# Patient Record
Sex: MALE | Race: WHITE | Employment: UNEMPLOYED | URBAN - NONMETROPOLITAN AREA
[De-identification: names, ages, dates, MRNs, and addresses within clinical notes are randomized per-mention and may not be internally consistent; named-entity substitution may affect disease eponyms.]

---

## 2018-10-02 ENCOUNTER — HOSPITAL ENCOUNTER (OUTPATIENT)
Age: 33
Discharge: HOME | End: 2018-10-02
Payer: COMMERCIAL

## 2018-10-02 LAB
ABSOLUTE EOS #: 0.2 K/UL (ref 0–0.4)
ABSOLUTE IMMATURE GRANULOCYTE: ABNORMAL K/UL (ref 0–0.3)
ABSOLUTE LYMPH #: 3.4 K/UL (ref 1–4.8)
ABSOLUTE MONO #: 0.5 K/UL (ref 0–1)
ALBUMIN SERPL-MCNC: 4.4 G/DL (ref 3.5–5.2)
ALBUMIN/GLOBULIN RATIO: ABNORMAL (ref 1–2.5)
ALP BLD-CCNC: 67 U/L (ref 40–129)
ALT SERPL-CCNC: 466 U/L (ref 5–41)
AST SERPL-CCNC: 163 U/L
BASOPHILS # BLD: 1 % (ref 0–2)
BASOPHILS ABSOLUTE: 0 K/UL (ref 0–0.2)
BILIRUB SERPL-MCNC: 0.51 MG/DL (ref 0.3–1.2)
BILIRUBIN DIRECT: 0.25 MG/DL
BILIRUBIN, INDIRECT: 0.26 MG/DL (ref 0–1)
DIFFERENTIAL TYPE: YES
EOSINOPHILS RELATIVE PERCENT: 4 % (ref 0–5)
GLOBULIN: ABNORMAL G/DL (ref 1.5–3.8)
HAV IGM SER IA-ACNC: ABNORMAL
HCT VFR BLD CALC: 43.5 % (ref 41–53)
HEMOGLOBIN: 14.9 G/DL (ref 13.5–17.5)
HEPATITIS B CORE IGM ANTIBODY: NONREACTIVE
HEPATITIS B SURFACE ANTIGEN: NONREACTIVE
HEPATITIS C ANTIBODY: REACTIVE
HIV AG/AB: NONREACTIVE
IMMATURE GRANULOCYTES: ABNORMAL %
LYMPHOCYTES # BLD: 59 % (ref 13–44)
MCH RBC QN AUTO: 29.7 PG (ref 26–34)
MCHC RBC AUTO-ENTMCNC: 34.4 G/DL (ref 31–37)
MCV RBC AUTO: 86.4 FL (ref 80–100)
MONOCYTES # BLD: 8 % (ref 5–9)
NRBC AUTOMATED: ABNORMAL PER 100 WBC
PDW BLD-RTO: 14 % (ref 12.1–15.2)
PLATELET # BLD: 234 K/UL (ref 140–450)
PLATELET ESTIMATE: ABNORMAL
PMV BLD AUTO: ABNORMAL FL (ref 6–12)
RBC # BLD: 5.03 M/UL (ref 4.5–5.9)
RBC # BLD: ABNORMAL 10*6/UL
SEG NEUTROPHILS: 28 % (ref 39–75)
SEGMENTED NEUTROPHILS ABSOLUTE COUNT: 1.6 K/UL (ref 2.1–6.5)
TOTAL PROTEIN: 7.5 G/DL (ref 6.4–8.3)
WBC # BLD: 5.7 K/UL (ref 3.5–11)
WBC # BLD: ABNORMAL 10*3/UL

## 2018-10-02 PROCEDURE — 87522 HEPATITIS C REVRS TRNSCRPJ: CPT

## 2018-10-02 PROCEDURE — 82310 ASSAY OF CALCIUM: CPT

## 2018-10-02 PROCEDURE — 86480 TB TEST CELL IMMUN MEASURE: CPT

## 2018-10-02 PROCEDURE — 87389 HIV-1 AG W/HIV-1&-2 AB AG IA: CPT

## 2018-10-02 PROCEDURE — 36415 COLL VENOUS BLD VENIPUNCTURE: CPT

## 2018-10-02 PROCEDURE — 82565 ASSAY OF CREATININE: CPT

## 2018-10-02 PROCEDURE — 80076 HEPATIC FUNCTION PANEL: CPT

## 2018-10-02 PROCEDURE — 80074 ACUTE HEPATITIS PANEL: CPT

## 2018-10-02 PROCEDURE — 82947 ASSAY GLUCOSE BLOOD QUANT: CPT

## 2018-10-02 PROCEDURE — 85025 COMPLETE CBC W/AUTO DIFF WBC: CPT

## 2018-10-02 PROCEDURE — 80051 ELECTROLYTE PANEL: CPT

## 2018-10-02 PROCEDURE — 84520 ASSAY OF UREA NITROGEN: CPT

## 2018-10-03 LAB
ANION GAP SERPL CALCULATED.3IONS-SCNC: 13 MMOL/L (ref 9–17)
BUN BLDV-MCNC: 22 MG/DL (ref 6–20)
CALCIUM SERPL-MCNC: 9.5 MG/DL (ref 8.6–10.4)
CHLORIDE BLD-SCNC: 104 MMOL/L (ref 98–107)
CO2: 23 MMOL/L (ref 20–31)
CREAT SERPL-MCNC: 1.06 MG/DL (ref 0.7–1.2)
GFR AFRICAN AMERICAN: >60 ML/MIN
GFR NON-AFRICAN AMERICAN: >60 ML/MIN
GFR SERPL CREATININE-BSD FRML MDRD: ABNORMAL ML/MIN/{1.73_M2}
GFR SERPL CREATININE-BSD FRML MDRD: ABNORMAL ML/MIN/{1.73_M2}
GLUCOSE BLD-MCNC: 86 MG/DL (ref 70–99)
POTASSIUM SERPL-SCNC: 4.4 MMOL/L (ref 3.7–5.3)
SODIUM BLD-SCNC: 140 MMOL/L (ref 135–144)

## 2018-10-04 LAB
DIRECT EXAM: ABNORMAL
Lab: ABNORMAL
SPECIMEN DESCRIPTION: ABNORMAL
STATUS: ABNORMAL

## 2018-10-05 LAB
SEND OUT REPORT: NORMAL
TEST NAME: NORMAL

## 2018-10-07 LAB
QUANTI TB GOLD PLUS: NEGATIVE
QUANTI TB1 MINUS NIL: 0.03 IU/ML (ref 0–0.34)
QUANTI TB2 MINUS NIL: 0.09 IU/ML (ref 0–0.34)
QUANTIFERON MITOGEN: >10 IU/ML
QUANTIFERON NIL: 0.09 IU/ML

## 2019-04-12 ENCOUNTER — HOSPITAL ENCOUNTER (OUTPATIENT)
Age: 34
Discharge: HOME | End: 2019-04-12
Payer: COMMERCIAL

## 2019-04-12 LAB
ABSOLUTE EOS #: 0.2 K/UL (ref 0–0.4)
ABSOLUTE IMMATURE GRANULOCYTE: ABNORMAL K/UL (ref 0–0.3)
ABSOLUTE LYMPH #: 3.7 K/UL (ref 1–4.8)
ABSOLUTE MONO #: 0.6 K/UL (ref 0–1)
ALBUMIN SERPL-MCNC: 4.3 G/DL (ref 3.5–5.2)
ALBUMIN/GLOBULIN RATIO: ABNORMAL (ref 1–2.5)
ALP BLD-CCNC: 61 U/L (ref 40–129)
ALT SERPL-CCNC: 137 U/L (ref 5–41)
ANION GAP SERPL CALCULATED.3IONS-SCNC: 11 MMOL/L (ref 9–17)
AST SERPL-CCNC: 76 U/L
BASOPHILS # BLD: 0 % (ref 0–2)
BASOPHILS ABSOLUTE: 0 K/UL (ref 0–0.2)
BILIRUB SERPL-MCNC: 0.47 MG/DL (ref 0.3–1.2)
BILIRUBIN DIRECT: <0.08 MG/DL
BILIRUBIN, INDIRECT: ABNORMAL MG/DL (ref 0–1)
BUN BLDV-MCNC: 16 MG/DL (ref 6–20)
CALCIUM SERPL-MCNC: 9.1 MG/DL (ref 8.6–10.4)
CHLORIDE BLD-SCNC: 102 MMOL/L (ref 98–107)
CO2: 25 MMOL/L (ref 20–31)
CREAT SERPL-MCNC: 0.89 MG/DL (ref 0.7–1.2)
DIFFERENTIAL TYPE: YES
EOSINOPHILS RELATIVE PERCENT: 4 % (ref 0–5)
GFR AFRICAN AMERICAN: >60 ML/MIN
GFR NON-AFRICAN AMERICAN: >60 ML/MIN
GFR SERPL CREATININE-BSD FRML MDRD: ABNORMAL ML/MIN/{1.73_M2}
GFR SERPL CREATININE-BSD FRML MDRD: ABNORMAL ML/MIN/{1.73_M2}
GLUCOSE BLD-MCNC: 107 MG/DL (ref 70–99)
HCT VFR BLD CALC: 40.9 % (ref 41–53)
HEMOGLOBIN: 13.7 G/DL (ref 13.5–17.5)
IMMATURE GRANULOCYTES: ABNORMAL %
LYMPHOCYTES # BLD: 60 % (ref 13–44)
MCH RBC QN AUTO: 28.8 PG (ref 26–34)
MCHC RBC AUTO-ENTMCNC: 33.4 G/DL (ref 31–37)
MCV RBC AUTO: 86.2 FL (ref 80–100)
MONOCYTES # BLD: 9 % (ref 5–9)
NRBC AUTOMATED: ABNORMAL PER 100 WBC
PDW BLD-RTO: 14.1 % (ref 12.1–15.2)
PLATELET # BLD: 263 K/UL (ref 140–450)
PLATELET ESTIMATE: ABNORMAL
PMV BLD AUTO: ABNORMAL FL (ref 6–12)
POTASSIUM SERPL-SCNC: 3.8 MMOL/L (ref 3.7–5.3)
RBC # BLD: 4.74 M/UL (ref 4.5–5.9)
RBC # BLD: ABNORMAL 10*6/UL
SEG NEUTROPHILS: 27 % (ref 39–75)
SEGMENTED NEUTROPHILS ABSOLUTE COUNT: 1.7 K/UL (ref 2.1–6.5)
SODIUM BLD-SCNC: 138 MMOL/L (ref 135–144)
TOTAL PROTEIN: 7.8 G/DL (ref 6.4–8.3)
WBC # BLD: 6.2 K/UL (ref 3.5–11)
WBC # BLD: ABNORMAL 10*3/UL

## 2019-04-12 PROCEDURE — 87522 HEPATITIS C REVRS TRNSCRPJ: CPT

## 2019-04-12 PROCEDURE — 87389 HIV-1 AG W/HIV-1&-2 AB AG IA: CPT

## 2019-04-12 PROCEDURE — 36415 COLL VENOUS BLD VENIPUNCTURE: CPT

## 2019-04-12 PROCEDURE — 85025 COMPLETE CBC W/AUTO DIFF WBC: CPT

## 2019-04-12 PROCEDURE — 80053 COMPREHEN METABOLIC PANEL: CPT

## 2019-04-12 PROCEDURE — 82248 BILIRUBIN DIRECT: CPT

## 2019-04-12 PROCEDURE — 86480 TB TEST CELL IMMUN MEASURE: CPT

## 2019-04-12 PROCEDURE — 80074 ACUTE HEPATITIS PANEL: CPT

## 2019-04-13 LAB
HAV IGM SER IA-ACNC: NONREACTIVE
HEPATITIS B CORE IGM ANTIBODY: NONREACTIVE
HEPATITIS B SURFACE ANTIGEN: NONREACTIVE
HEPATITIS C ANTIBODY: REACTIVE

## 2019-04-14 LAB
QUANTI TB GOLD PLUS: NEGATIVE
QUANTI TB1 MINUS NIL: 0.06 IU/ML (ref 0–0.34)
QUANTI TB2 MINUS NIL: 0.04 IU/ML (ref 0–0.34)
QUANTIFERON MITOGEN: 8.2 IU/ML
QUANTIFERON NIL: 0.02 IU/ML

## 2019-04-15 LAB
DIRECT EXAM: ABNORMAL
DIRECT EXAM: ABNORMAL
HIV AG/AB: NONREACTIVE
Lab: ABNORMAL
SPECIMEN DESCRIPTION: ABNORMAL

## 2019-06-09 ENCOUNTER — HOSPITAL ENCOUNTER (INPATIENT)
Age: 34
LOS: 2 days | Discharge: HOME | DRG: 383 | End: 2019-06-11
Attending: INTERNAL MEDICINE | Admitting: INTERNAL MEDICINE
Payer: COMMERCIAL

## 2019-06-09 ENCOUNTER — APPOINTMENT (OUTPATIENT)
Dept: CT IMAGING | Age: 34
DRG: 383 | End: 2019-06-09
Payer: COMMERCIAL

## 2019-06-09 DIAGNOSIS — L03.113 CELLULITIS OF RIGHT FOREARM: Primary | ICD-10-CM

## 2019-06-09 DIAGNOSIS — Z72.0 TOBACCO ABUSE: ICD-10-CM

## 2019-06-09 PROBLEM — L02.413 ABSCESS OF RIGHT FOREARM: Status: ACTIVE | Noted: 2019-06-09

## 2019-06-09 LAB
ABSOLUTE EOS #: 0.2 K/UL (ref 0–0.4)
ABSOLUTE IMMATURE GRANULOCYTE: ABNORMAL K/UL (ref 0–0.3)
ABSOLUTE LYMPH #: 2.5 K/UL (ref 1–4.8)
ABSOLUTE MONO #: 0.9 K/UL (ref 0–1)
ANION GAP SERPL CALCULATED.3IONS-SCNC: 13 MMOL/L (ref 9–17)
BASOPHILS # BLD: 0 % (ref 0–2)
BASOPHILS ABSOLUTE: 0 K/UL (ref 0–0.2)
BUN BLDV-MCNC: 16 MG/DL (ref 6–20)
BUN/CREAT BLD: 14 (ref 9–20)
CALCIUM SERPL-MCNC: 9.5 MG/DL (ref 8.6–10.4)
CHLORIDE BLD-SCNC: 99 MMOL/L (ref 98–107)
CO2: 25 MMOL/L (ref 20–31)
CREAT SERPL-MCNC: 1.11 MG/DL (ref 0.7–1.2)
DIFFERENTIAL TYPE: YES
EOSINOPHILS RELATIVE PERCENT: 2 % (ref 0–5)
GFR AFRICAN AMERICAN: >60 ML/MIN
GFR NON-AFRICAN AMERICAN: >60 ML/MIN
GFR SERPL CREATININE-BSD FRML MDRD: ABNORMAL ML/MIN/{1.73_M2}
GFR SERPL CREATININE-BSD FRML MDRD: ABNORMAL ML/MIN/{1.73_M2}
GLUCOSE BLD-MCNC: 109 MG/DL (ref 70–99)
HCT VFR BLD CALC: 41.1 % (ref 41–53)
HEMOGLOBIN: 14.3 G/DL (ref 13.5–17.5)
HIGH SENSITIVE C-REACTIVE PROTEIN: 118.7 MG/L
IMMATURE GRANULOCYTES: ABNORMAL %
LYMPHOCYTES # BLD: 23 % (ref 13–44)
MCH RBC QN AUTO: 29.6 PG (ref 26–34)
MCHC RBC AUTO-ENTMCNC: 34.9 G/DL (ref 31–37)
MCV RBC AUTO: 85.1 FL (ref 80–100)
MONOCYTES # BLD: 8 % (ref 5–9)
NRBC AUTOMATED: ABNORMAL PER 100 WBC
PDW BLD-RTO: 13.7 % (ref 12.1–15.2)
PLATELET # BLD: 300 K/UL (ref 140–450)
PLATELET ESTIMATE: ABNORMAL
PMV BLD AUTO: ABNORMAL FL (ref 6–12)
POTASSIUM SERPL-SCNC: 4.5 MMOL/L (ref 3.7–5.3)
RBC # BLD: 4.83 M/UL (ref 4.5–5.9)
RBC # BLD: ABNORMAL 10*6/UL
SEDIMENTATION RATE, ERYTHROCYTE: 27 MM (ref 0–20)
SEG NEUTROPHILS: 67 % (ref 39–75)
SEGMENTED NEUTROPHILS ABSOLUTE COUNT: 7.4 K/UL (ref 2.1–6.5)
SODIUM BLD-SCNC: 137 MMOL/L (ref 135–144)
WBC # BLD: 10.9 K/UL (ref 3.5–11)
WBC # BLD: ABNORMAL 10*3/UL

## 2019-06-09 PROCEDURE — 87070 CULTURE OTHR SPECIMN AEROBIC: CPT

## 2019-06-09 PROCEDURE — 87040 BLOOD CULTURE FOR BACTERIA: CPT

## 2019-06-09 PROCEDURE — 6370000000 HC RX 637 (ALT 250 FOR IP): Performed by: INTERNAL MEDICINE

## 2019-06-09 PROCEDURE — 2500000003 HC RX 250 WO HCPCS: Performed by: ORTHOPAEDIC SURGERY

## 2019-06-09 PROCEDURE — 6360000002 HC RX W HCPCS: Performed by: ORTHOPAEDIC SURGERY

## 2019-06-09 PROCEDURE — 85025 COMPLETE CBC W/AUTO DIFF WBC: CPT

## 2019-06-09 PROCEDURE — 94761 N-INVAS EAR/PLS OXIMETRY MLT: CPT

## 2019-06-09 PROCEDURE — 86141 C-REACTIVE PROTEIN HS: CPT

## 2019-06-09 PROCEDURE — 2580000003 HC RX 258: Performed by: INTERNAL MEDICINE

## 2019-06-09 PROCEDURE — 96365 THER/PROPH/DIAG IV INF INIT: CPT

## 2019-06-09 PROCEDURE — 80048 BASIC METABOLIC PNL TOTAL CA: CPT

## 2019-06-09 PROCEDURE — 99284 EMERGENCY DEPT VISIT MOD MDM: CPT

## 2019-06-09 PROCEDURE — 2500000003 HC RX 250 WO HCPCS: Performed by: INTERNAL MEDICINE

## 2019-06-09 PROCEDURE — 6360000002 HC RX W HCPCS: Performed by: INTERNAL MEDICINE

## 2019-06-09 PROCEDURE — 87205 SMEAR GRAM STAIN: CPT

## 2019-06-09 PROCEDURE — 87075 CULTR BACTERIA EXCEPT BLOOD: CPT

## 2019-06-09 PROCEDURE — 6360000004 HC RX CONTRAST MEDICATION: Performed by: INTERNAL MEDICINE

## 2019-06-09 PROCEDURE — 73201 CT UPPER EXTREMITY W/DYE: CPT

## 2019-06-09 PROCEDURE — 1200000000 HC SEMI PRIVATE

## 2019-06-09 PROCEDURE — 85651 RBC SED RATE NONAUTOMATED: CPT

## 2019-06-09 PROCEDURE — 96375 TX/PRO/DX INJ NEW DRUG ADDON: CPT

## 2019-06-09 RX ORDER — OLANZAPINE 2.5 MG/1
10 TABLET ORAL NIGHTLY
Status: DISCONTINUED | OUTPATIENT
Start: 2019-06-09 | End: 2019-06-11 | Stop reason: HOSPADM

## 2019-06-09 RX ORDER — BUSPIRONE HYDROCHLORIDE 30 MG/1
30 TABLET ORAL 2 TIMES DAILY
COMMUNITY

## 2019-06-09 RX ORDER — MORPHINE SULFATE 4 MG/ML
4 INJECTION, SOLUTION INTRAMUSCULAR; INTRAVENOUS ONCE
Status: COMPLETED | OUTPATIENT
Start: 2019-06-09 | End: 2019-06-09

## 2019-06-09 RX ORDER — ACETAMINOPHEN 325 MG/1
650 TABLET ORAL EVERY 4 HOURS PRN
Status: DISCONTINUED | OUTPATIENT
Start: 2019-06-09 | End: 2019-06-11 | Stop reason: HOSPADM

## 2019-06-09 RX ORDER — MORPHINE SULFATE 4 MG/ML
4 INJECTION, SOLUTION INTRAMUSCULAR; INTRAVENOUS EVERY 4 HOURS PRN
Status: DISCONTINUED | OUTPATIENT
Start: 2019-06-09 | End: 2019-06-11 | Stop reason: HOSPADM

## 2019-06-09 RX ORDER — ONDANSETRON 2 MG/ML
4 INJECTION INTRAMUSCULAR; INTRAVENOUS ONCE
Status: COMPLETED | OUTPATIENT
Start: 2019-06-09 | End: 2019-06-09

## 2019-06-09 RX ORDER — KETOROLAC TROMETHAMINE 10 MG/1
10 TABLET, FILM COATED ORAL EVERY 6 HOURS PRN
Qty: 20 TABLET | Refills: 0 | Status: SHIPPED | OUTPATIENT
Start: 2019-06-09 | End: 2019-06-11 | Stop reason: HOSPADM

## 2019-06-09 RX ORDER — SODIUM CHLORIDE 0.9 % (FLUSH) 0.9 %
10 SYRINGE (ML) INJECTION EVERY 12 HOURS SCHEDULED
Status: DISCONTINUED | OUTPATIENT
Start: 2019-06-09 | End: 2019-06-11 | Stop reason: HOSPADM

## 2019-06-09 RX ORDER — TRAMADOL HYDROCHLORIDE 50 MG/1
50 TABLET ORAL EVERY 12 HOURS PRN
Qty: 8 TABLET | Refills: 0 | Status: SHIPPED | OUTPATIENT
Start: 2019-06-09 | End: 2019-06-09 | Stop reason: ALTCHOICE

## 2019-06-09 RX ORDER — CEPHALEXIN 500 MG/1
500 CAPSULE ORAL 4 TIMES DAILY
Qty: 40 CAPSULE | Refills: 0 | Status: SHIPPED | OUTPATIENT
Start: 2019-06-09 | End: 2019-06-11 | Stop reason: HOSPADM

## 2019-06-09 RX ORDER — METHYLPREDNISOLONE SODIUM SUCCINATE 125 MG/2ML
125 INJECTION, POWDER, LYOPHILIZED, FOR SOLUTION INTRAMUSCULAR; INTRAVENOUS ONCE
Status: COMPLETED | OUTPATIENT
Start: 2019-06-09 | End: 2019-06-09

## 2019-06-09 RX ORDER — BACLOFEN 10 MG/1
40 TABLET ORAL DAILY
Status: DISCONTINUED | OUTPATIENT
Start: 2019-06-09 | End: 2019-06-11 | Stop reason: HOSPADM

## 2019-06-09 RX ORDER — SODIUM CHLORIDE 0.9 % (FLUSH) 0.9 %
10 SYRINGE (ML) INJECTION PRN
Status: DISCONTINUED | OUTPATIENT
Start: 2019-06-09 | End: 2019-06-11 | Stop reason: HOSPADM

## 2019-06-09 RX ORDER — CEPHALEXIN 500 MG/1
500 CAPSULE ORAL ONCE
Status: COMPLETED | OUTPATIENT
Start: 2019-06-09 | End: 2019-06-09

## 2019-06-09 RX ORDER — PREDNISONE 20 MG/1
40 TABLET ORAL DAILY
Qty: 6 TABLET | Refills: 0 | Status: SHIPPED | OUTPATIENT
Start: 2019-06-09 | End: 2019-06-11 | Stop reason: HOSPADM

## 2019-06-09 RX ORDER — ZOLPIDEM TARTRATE 12.5 MG/1
12.5 TABLET, FILM COATED, EXTENDED RELEASE ORAL NIGHTLY
Status: DISCONTINUED | OUTPATIENT
Start: 2019-06-09 | End: 2019-06-10

## 2019-06-09 RX ORDER — LIDOCAINE HYDROCHLORIDE 10 MG/ML
5 INJECTION, SOLUTION INFILTRATION; PERINEURAL ONCE
Status: COMPLETED | OUTPATIENT
Start: 2019-06-09 | End: 2019-06-09

## 2019-06-09 RX ORDER — BUSPIRONE HYDROCHLORIDE 5 MG/1
30 TABLET ORAL 2 TIMES DAILY
Status: DISCONTINUED | OUTPATIENT
Start: 2019-06-09 | End: 2019-06-11 | Stop reason: HOSPADM

## 2019-06-09 RX ORDER — FLUOXETINE HYDROCHLORIDE 20 MG/1
40 CAPSULE ORAL 2 TIMES DAILY
COMMUNITY

## 2019-06-09 RX ORDER — CLINDAMYCIN PHOSPHATE 900 MG/50ML
900 INJECTION INTRAVENOUS EVERY 6 HOURS
Status: DISCONTINUED | OUTPATIENT
Start: 2019-06-09 | End: 2019-06-11 | Stop reason: HOSPADM

## 2019-06-09 RX ORDER — ZOLPIDEM TARTRATE 12.5 MG/1
12.5 TABLET, FILM COATED, EXTENDED RELEASE ORAL NIGHTLY
COMMUNITY

## 2019-06-09 RX ORDER — OLANZAPINE 10 MG/1
10 TABLET ORAL NIGHTLY
COMMUNITY

## 2019-06-09 RX ORDER — TRAZODONE HYDROCHLORIDE 50 MG/1
300 TABLET ORAL NIGHTLY
Status: DISCONTINUED | OUTPATIENT
Start: 2019-06-09 | End: 2019-06-11 | Stop reason: HOSPADM

## 2019-06-09 RX ORDER — MORPHINE SULFATE 2 MG/ML
2 INJECTION, SOLUTION INTRAMUSCULAR; INTRAVENOUS EVERY 4 HOURS PRN
Status: DISCONTINUED | OUTPATIENT
Start: 2019-06-09 | End: 2019-06-11 | Stop reason: HOSPADM

## 2019-06-09 RX ORDER — BACLOFEN 20 MG/1
40 TABLET ORAL DAILY
COMMUNITY

## 2019-06-09 RX ORDER — LEVOMEFOLATE/ALGAL OIL 15-90.314
1 CAPSULE ORAL DAILY
Status: DISCONTINUED | OUTPATIENT
Start: 2019-06-10 | End: 2019-06-11 | Stop reason: HOSPADM

## 2019-06-09 RX ORDER — FLUOXETINE 10 MG/1
40 CAPSULE ORAL 2 TIMES DAILY
Status: DISCONTINUED | OUTPATIENT
Start: 2019-06-09 | End: 2019-06-11 | Stop reason: HOSPADM

## 2019-06-09 RX ORDER — CLINDAMYCIN HYDROCHLORIDE 300 MG/1
300 CAPSULE ORAL 3 TIMES DAILY
Qty: 30 CAPSULE | Refills: 0 | Status: SHIPPED | OUTPATIENT
Start: 2019-06-09 | End: 2019-06-11 | Stop reason: HOSPADM

## 2019-06-09 RX ORDER — LIDOCAINE HYDROCHLORIDE 10 MG/ML
5 INJECTION, SOLUTION INFILTRATION; PERINEURAL ONCE
Status: DISCONTINUED | OUTPATIENT
Start: 2019-06-09 | End: 2019-06-11 | Stop reason: HOSPADM

## 2019-06-09 RX ORDER — ONDANSETRON 2 MG/ML
4 INJECTION INTRAMUSCULAR; INTRAVENOUS EVERY 6 HOURS PRN
Status: DISCONTINUED | OUTPATIENT
Start: 2019-06-09 | End: 2019-06-11 | Stop reason: HOSPADM

## 2019-06-09 RX ORDER — TRAZODONE HYDROCHLORIDE 150 MG/1
300 TABLET ORAL NIGHTLY
COMMUNITY

## 2019-06-09 RX ORDER — CLINDAMYCIN PHOSPHATE 600 MG/50ML
600 INJECTION INTRAVENOUS ONCE
Status: COMPLETED | OUTPATIENT
Start: 2019-06-09 | End: 2019-06-09

## 2019-06-09 RX ADMIN — IOPAMIDOL 75 ML: 755 INJECTION, SOLUTION INTRAVENOUS at 15:33

## 2019-06-09 RX ADMIN — FLUOXETINE 40 MG: 10 CAPSULE ORAL at 20:38

## 2019-06-09 RX ADMIN — VANCOMYCIN HYDROCHLORIDE 1500 MG: 750 INJECTION, POWDER, LYOPHILIZED, FOR SOLUTION INTRAVENOUS at 22:51

## 2019-06-09 RX ADMIN — BUSPIRONE HYDROCHLORIDE 30 MG: 5 TABLET ORAL at 20:36

## 2019-06-09 RX ADMIN — ONDANSETRON 4 MG: 2 INJECTION, SOLUTION INTRAMUSCULAR; INTRAVENOUS at 13:54

## 2019-06-09 RX ADMIN — CLINDAMYCIN PHOSPHATE 600 MG: 12 INJECTION, SOLUTION INTRAMUSCULAR; INTRAVENOUS at 13:54

## 2019-06-09 RX ADMIN — MORPHINE SULFATE 4 MG: 4 INJECTION, SOLUTION INTRAMUSCULAR; INTRAVENOUS at 18:52

## 2019-06-09 RX ADMIN — Medication 10 ML: at 22:30

## 2019-06-09 RX ADMIN — Medication 10 ML: at 18:22

## 2019-06-09 RX ADMIN — MORPHINE SULFATE 4 MG: 4 INJECTION INTRAVENOUS at 13:54

## 2019-06-09 RX ADMIN — LIDOCAINE HYDROCHLORIDE 5 ML: 10 INJECTION, SOLUTION INFILTRATION; PERINEURAL at 18:52

## 2019-06-09 RX ADMIN — TRAZODONE HYDROCHLORIDE 300 MG: 50 TABLET ORAL at 20:38

## 2019-06-09 RX ADMIN — MORPHINE SULFATE 4 MG: 4 INJECTION, SOLUTION INTRAMUSCULAR; INTRAVENOUS at 22:29

## 2019-06-09 RX ADMIN — CLINDAMYCIN PHOSPHATE 900 MG: 900 INJECTION, SOLUTION INTRAVENOUS at 22:30

## 2019-06-09 RX ADMIN — CEPHALEXIN 500 MG: 500 CAPSULE ORAL at 13:54

## 2019-06-09 RX ADMIN — METHYLPREDNISOLONE SODIUM SUCCINATE 125 MG: 125 INJECTION, POWDER, FOR SOLUTION INTRAMUSCULAR; INTRAVENOUS at 14:42

## 2019-06-09 RX ADMIN — OLANZAPINE 10 MG: 2.5 TABLET, FILM COATED ORAL at 20:37

## 2019-06-09 ASSESSMENT — PAIN SCALES - GENERAL
PAINLEVEL_OUTOF10: 7
PAINLEVEL_OUTOF10: 4
PAINLEVEL_OUTOF10: 9
PAINLEVEL_OUTOF10: 9
PAINLEVEL_OUTOF10: 8
PAINLEVEL_OUTOF10: 6
PAINLEVEL_OUTOF10: 7

## 2019-06-09 ASSESSMENT — PAIN DESCRIPTION - FREQUENCY: FREQUENCY: CONTINUOUS

## 2019-06-09 ASSESSMENT — PAIN DESCRIPTION - ONSET: ONSET: ON-GOING

## 2019-06-09 ASSESSMENT — PAIN DESCRIPTION - PAIN TYPE
TYPE: ACUTE PAIN
TYPE: ACUTE PAIN

## 2019-06-09 ASSESSMENT — PAIN DESCRIPTION - LOCATION: LOCATION: ARM

## 2019-06-09 ASSESSMENT — PAIN DESCRIPTION - ORIENTATION: ORIENTATION: RIGHT

## 2019-06-09 ASSESSMENT — PAIN DESCRIPTION - DESCRIPTORS: DESCRIPTORS: ACHING;BURNING

## 2019-06-09 NOTE — PROGRESS NOTES
Ortho Consult Dictated  Right arm cellulitis/abscess  Consent signed for bedside I&D under local.  No ilana pus, sclerosed firm surrounding tissue. Culture taken. Rec IV abx1-2 days with conversion to po. Hx of Hep C, depression, tobacco abuse.

## 2019-06-09 NOTE — ED PROVIDER NOTES
Robert Shah Peter 58      Pt Name: King Boyd  MRN: [id number]  [place] 1985  Date of evaluation: 6/9/2019  Provider: Geeta Benoit MD    CHIEF COMPLAINT       Chief Complaint   Patient presents with    Arm Pain     Arm pain for three weeks. Believes he has an insect bite three weeks ago and now is red and swollen. HISTORY OF PRESENT ILLNESS   (Location/Symptom, Timing/Onset, Context/Setting, Quality, Duration, Modifying Factors, Severity)  Note limiting factors. King Boyd is a 29 y.o. male who presents to the emergency department for evaluation and management of right forearm pain and swelling which started about 3 weeks ago after he woke up with pain in the forearm. He presumes that he was bit by something but is not sure. He says that it started becoming tense, progressively reddening and over time it worsened. During the last 2 days it has worsened the most. He denies fever. But there is pain with movement. He is right-handed. He has not seen any other providers for this. Further record review shows that he is on Suboxone but did not reveal this to the staff. He denies using or injecting drugs currently. He denies injecting drugs into that arm. HPI    Nursing Notes were reviewed. REVIEW OF SYSTEMS    (2-9 systems for level 4, 10 or more for level 5)       REVIEW OF SYSTEMS    Constitutional: Negative for fatigue and fever. Musculoskeletal: Positive for right  forearm tenderness, swelling and redness extending to the shoulder. Negative for back pain and neck pain. Skin: Positive for color change, but pallor, rash and wound. Allergic/Immunologic: Negative for environmental allergiesand food allergies. Neurological: Negative for dizziness, speech difficulty, weakness, distal tingling/numbness and headaches. Hematological: Negative for immunodeficiency, Negative for adenopathy. Does not bruise/bleed easily.   All other systems reviewed and are negative. Except as noted above theremainder of the review of systems was reviewed and negative. PASTMEDICAL HISTORY   History reviewed. No pertinent past medical history. SURGICAL HISTORY     History reviewed. No pertinent surgical history. CURRENT MEDICATIONS       Current Discharge Medication List      CONTINUE these medications which have NOT CHANGED    Details   traZODone (DESYREL) 150 MG tablet Take 300 mg by mouth nightly      OLANZapine (ZYPREXA) 10 MG tablet Take 10 mg by mouth nightly      baclofen (LIORESAL) 20 MG tablet Take 40 mg by mouth daily      FLUoxetine (PROZAC) 20 MG capsule Take 40 mg by mouth 2 times daily      L-Methylfolate-Algae (DEPLIN 15 PO) Take by mouth      zolpidem (AMBIEN CR) 12.5 MG extended release tablet Take 12.5 mg by mouth nightly. busPIRone (BUSPAR) 30 MG tablet Take 30 mg by mouth 2 times daily             ALLERGIES     Patient has no known allergies. FAMILY HISTORY     History reviewed. No pertinent family history.        SOCIAL HISTORY       Social History     Socioeconomic History    Marital status: Single     Spouse name: None    Number of children: None    Years of education: None    Highest education level: None   Occupational History    None   Social Needs    Financial resource strain: None    Food insecurity:     Worry: None     Inability: None    Transportation needs:     Medical: None     Non-medical: None   Tobacco Use    Smoking status: Current Every Day Smoker    Smokeless tobacco: Never Used   Substance and Sexual Activity    Alcohol use: None    Drug use: None    Sexual activity: None   Lifestyle    Physical activity:     Days per week: None     Minutes per session: None    Stress: None   Relationships    Social connections:     Talks on phone: None     Gets together: None     Attends [employer] service: None     Active member of club or organization: None     Attends meetings of clubs or organizations: None     Relationship status: None    Intimate partner violence:     Fear of current or ex partner: None     Emotionally abused: None     Physically abused: None     Forced sexual activity: None   Other Topics Concern    None   Social History Narrative    None       SCREENINGS    [place] Coma Scale  Eye Opening: Spontaneous  Best Verbal Response: Oriented  Best Motor Response: Obeys commands  Michael Coma Scale Score: 15        PHYSICAL EXAM    (up to 7 for level 4, 8 or more for level 5)     ED Triage Vitals [06/09/19 1225]   BP Temp Temp Source Pulse Resp SpO2 Height Weight   118/70 98.1 °F (36.7 °C) Oral 79 -- 100 % 6' (1.829 m) 202 lb 6.1 oz (91.8 kg)       Physical Exam  Physical Exam   Constitutional:  Appears well, well-developed and well-nourished. No distress noted. Non toxic in appearance. HENT:     Head: Normocephalic and atraumatic. Mouth/Throat: Oropharynx is clear and mucosa moist. No oropharyngeal exudate noted. Posterior pharynx is pink and noninjected. Eyes: Conjunctivae and EOM are normal. Pupils are equal,round, and reactive to light. No scleral icterus. Neck: Normal range of motion. Neck supple. No tracheal deviation present. Cardiovascular: Normal rate, regular rhythm, normal heartsounds and intact distal pulses including radial pulses and brisk cap refill in the fingertips. Exam reveals no gallop or friction rub. No murmur heard. Pulmonary/Chest: Effort normal and breath sounds are symmetric and normal. No respiratory distress. There are no wheezes, rales or rhonchi. No tenderness is exhibited upon palpation of the chest wall. Abdominal: Soft. Bowel sounds are normal. No distension or no mass exhibitted. There is no tenderness, rebound, rigidity or guarding. Genitourinary:   No CVA tenderness noted on examination. Musculoskeletal: Right forearm is diffuse circumferential erythema, swelling duration and indurated without fluctuant collection.   No central wound marks identified. The erythema extends from the wrist to the elbow. Range of motion causes pain up to the shoulder. Otherwise normal range of motion. Lymphadenopathy:  No cervical adenopathy. Neurological:   alert and oriented to person, place, and time. Reflexes are normal.  There are no cranial nerve deficits. Normal muscle tone, motor and sensory function exhibitted. Coordination and gait normal.   Skin: Skin is warm and dry. No rash noted. No diaphoresis. No erythema. No pallor. Psychiatric: Pleasant and cooperative. normal mood and affect. Behavior is  normal. Judgment and thought content normal.     DIAGNOSTIC RESULTS     EKG: All EKG's are interpreted by the Emergency Department Physician who either signs or Co-signs this chart in the absence of a cardiologist.    Not indicated. RADIOLOGY:   Non-plain film images such as CT, Ultrasoundand MRI are read by the radiologist. Plain radiographic images are visualized and preliminarily interpreted by the emergency physician with the below findings:    Not indicated. Interpretation per the Radiologist below, if available at the time of this note:    CT RADIUS ULNA RIGHT W CONTRAST   Final Result      Diffuse subcutaneous edematous changes with overlying skin thickening    throughout the right radius/ulna most consistent with cellulitis. Additionally    there is a 2.6 x 1.3 x 2.1 cm rim-enhancing fluid collection within the soft    tissues adjacent to the mid ulna, most consistent with abscess with significant    surrounding inflammatory changes.                ED BEDSIDE ULTRASOUND:   Performed by ED Physician - none    LABS:  Labs Reviewed   WOUND CULTURE - Abnormal; Notable for the following components:       Result Value    Direct Exam RARE NEUTROPHILS (*)     All other components within normal limits   CBC WITH AUTO DIFFERENTIAL - Abnormal; Notable for the following components:    Segs Absolute 7.40 (*)     All other components within normal limits   BASIC METABOLIC PANEL W/ REFLEX TO MG FOR LOW K - Abnormal; Notable for the following components:    Glucose 109 (*)     All other components within normal limits   C-REACTIVE PROTEIN HIGH SENSITIVITY - Abnormal; Notable for the following components:    CRP, High Sensitivity 118.7 (*)     All other components within normal limits   SEDIMENTATION RATE - Abnormal; Notable for the following components:    Sed Rate 27 (*)     All other components within normal limits   CBC WITH AUTO DIFFERENTIAL - Abnormal; Notable for the following components:    WBC 11.1 (*)     RBC 4.45 (*)     Hemoglobin 13.0 (*)     Hematocrit 38.2 (*)     Seg Neutrophils 80 (*)     Monocytes 2 (*)     Segs Absolute 8.90 (*)     All other components within normal limits   BASIC METABOLIC PANEL W/ REFLEX TO MG FOR LOW K - Abnormal; Notable for the following components:    Glucose 193 (*)     Bun/Cre Ratio 22 (*)     All other components within normal limits   CULTURE BLOOD #1   ANAEROBIC AND AEROBIC CULTURE   CBC WITH AUTO DIFFERENTIAL   BASIC METABOLIC PANEL       All other labs were within normal range or not returned as of this dictation. EMERGENCY DEPARTMENT COURSE and DIFFERENTIAL DIAGNOSIS/MDM:   Vitals:    Vitals:    06/10/19 1006 06/10/19 1115 06/10/19 1642 06/10/19 1941   BP:   116/72    Pulse:   72    Resp:   18    Temp:   97.6 °F (36.4 °C)    TempSrc:   Oral    SpO2: 94%  97% 96%   Weight:       Height:  5' 11\" (1.803 m)         Noted    MDM    CRITICAL CARE TIME   Total Critical Care time was 0 minutes. [facility]  ED Course as of Dane 10 2033   Gonzalez Hanks Jun 09, 2019   1421 Call placed to hospitalist to discuss admission. [MS]   56 Dr. Magdaleno Clark returned call. We discussed this case and a CT scan will be obtained of his arms to rule out abscess. Orthopedic doctor on call us to be contacted for consultation after results are obtained. [MS]   [phone] I called Dr. Wiley Nur for consultation.  No answer on his phone.    [MS]   [phone] I requested that a call be placed to Dr. Gabriel Officer for consultation. [MS]   [phone] [address] [facility] Dr. Gabriel Officer return call. He will consult on this patient. He indicated that Clindamycin 900 mg every 6 hours is appropriate    [MS]   [phone] Call placed to hospitalist to discuss admission. [MS]   1501 I spoke with Dr. James Torres who accepted the patient for admission to his service. He will start him on vancomycin and upgrade his clindamycin dose as recommended. [MS]      ED Course User Index  [MS] Fortino Garcia MD       CONSULTS:  IP CONSULT TO HOSPITALIST  PHARMACY TO DOSE VANCOMYCIN  IP CONSULT TO ORTHOPEDIC SURGERY    PROCEDURES:  Unlessotherwise noted below, none     Procedures      Summation    Patient admitted to medicine service for right forearm cellulitis and abscess. Orthopedics was consulted for surgical intervention. Fluctuance not readily identified on examination therefore incision and drainage deferred to orthopedics. . He was given IV clindamycin and oral Keflex. Pain control was also provided. He is hemodynamically stable and not septic. Po Daley Patient Course:   ED Course as of Dane 10 2033   Genet Danish Jun 09, 2019   1421 Call placed to hospitalist to discuss admission. [MS]   [phone] Dr. James Torres returned call. We discussed this case and a CT scan will be obtained of his arms to rule out abscess. Orthopedic doctor on call us to be contacted for consultation after results are obtained. [MS]   [phone] I called Dr. Gabriel Officer for consultation. No answer on his phone.    [MS]   [phone] I requested that a call be placed to Dr. Gabriel Officer for consultation. [MS]   [phone] [facility] Dr. Gabriel Officer return call. He will consult on this patient. He indicated that Clindamycin 900 mg every 6 hours is appropriate    [MS]   [phone] Call placed to hospitalist to discuss admission. [MS]   9619 I spoke with Dr. James Torres who accepted the patient for admission to his service. He will start him on vancomycin and upgrade his clindamycin dose as recommended.     [MS]      ED Course User Index  [MS] Maddy Celestin MD         ED Medicationsadministered this visit:    Medications   baclofen (LIORESAL) tablet 40 mg (40 mg Oral Given 6/10/19 0934)   FLUoxetine (PROZAC) capsule 40 mg (40 mg Oral Given 6/10/19 0934)   DEPLIN 15 15-90.314 MG CAPS 1 capsule (1 capsule Oral Not Given 6/10/19 0936)   OLANZapine (ZYPREXA) tablet 10 mg (10 mg Oral Given 6/9/19 2037)   traZODone (DESYREL) tablet 300 mg (300 mg Oral Given 6/9/19 2038)   sodium chloride flush 0.9 % injection 10 mL (10 mLs Intravenous Not Given 6/10/19 0936)   sodium chloride flush 0.9 % injection 10 mL (10 mLs Intravenous Given 6/9/19 1822)   magnesium hydroxide (MILK OF MAGNESIA) 400 MG/5ML suspension 30 mL (has no administration in time range)   ondansetron (ZOFRAN) injection 4 mg (has no administration in time range)   enoxaparin (LOVENOX) injection 40 mg (40 mg Subcutaneous Not Given 6/10/19 0935)   acetaminophen (TYLENOL) tablet 650 mg (has no administration in time range)   clindamycin (CLEOCIN) 900 mg in dextrose 5 % 50 mL IVPB (900 mg Intravenous New Bag 6/10/19 1644)   morphine (PF) injection 2 mg (2 mg Intravenous Given 6/10/19 1653)     Or   morphine sulfate (PF) injection 4 mg ( Intravenous See Alternative 6/10/19 1653)   vancomycin (VANCOCIN) intermittent dosing (placeholder) ( Other Canceled Entry 6/9/19 1842)   vancomycin (VANCOCIN) 1,500 mg in dextrose 5 % 250 mL IVPB (0 mg Intravenous Stopped 6/10/19 1238)   busPIRone (BUSPAR) tablet 30 mg (30 mg Oral Given 6/10/19 0933)   zolpidem (AMBIEN CR) extended release tablet 12.5 mg (12.5 mg Oral Not Given 6/9/19 2100)   lidocaine 1 % injection 5 mL (has no administration in time range)   sodium chloride flush 0.9 % injection 10 mL (10 mLs Intravenous Given 6/10/19 0957)   sodium chloride flush 0.9 % injection 10 mL (10 mLs Intravenous Given 6/10/19 1646)   clindamycin (CLEOCIN) 600 mg in dextrose 5 % 50 mL IVPB (0 mg Intravenous Stopped 6/9/19 1436)   cephALEXin (KEFLEX) capsule 500

## 2019-06-09 NOTE — PROGRESS NOTES
Pharmacy Note  Vancomycin Consult    Melchor Philippe is a 29 y.o. male started on Vancomycin for cellulitis; consult received from Dr.Billy Roy to manage therapy. Also receiving the following antibiotics: clindamycin. Patient Active Problem List   Diagnosis    Abscess of right forearm       Allergies:  Patient has no known allergies. Temp max: 98.1 F    Recent Labs     06/09/19  1349   BUN 16       Recent Labs     06/09/19  1349   CREATININE 1.11       Recent Labs     06/09/19  1349   WBC 10.9       No intake or output data in the 24 hours ending 06/09/19 1814    Culture Date      Source                       Results  6/9/2019              Blood                         Pending    Ht Readings from Last 1 Encounters:   06/09/19 6' (1.829 m)        Wt Readings from Last 1 Encounters:   06/09/19 202 lb 6.1 oz (91.8 kg)         Body mass index is 27.45 kg/m². Estimated Creatinine Clearance: 103 mL/min (based on SCr of 1.11 mg/dL). Goal Trough Level: 10-15 mcg/mL    Assessment/Plan:  Will initiate vancomycin 1500 mg IV every 12 hours. Timing of trough level will be determined based on culture results, renal function, and clinical response. Thank you for the consult. Will continue to follow.     America Leahy PharmD  PGY-1 Pharmacy Resident   6/9/2019  6:19 PM

## 2019-06-10 LAB
ABSOLUTE EOS #: 0.1 K/UL (ref 0–0.4)
ABSOLUTE IMMATURE GRANULOCYTE: ABNORMAL K/UL (ref 0–0.3)
ABSOLUTE LYMPH #: 1.8 K/UL (ref 1–4.8)
ABSOLUTE MONO #: 0.2 K/UL (ref 0–1)
ANION GAP SERPL CALCULATED.3IONS-SCNC: 13 MMOL/L (ref 9–17)
BASOPHILS # BLD: 0 % (ref 0–2)
BASOPHILS ABSOLUTE: 0 K/UL (ref 0–0.2)
BUN BLDV-MCNC: 19 MG/DL (ref 6–20)
BUN/CREAT BLD: 22 (ref 9–20)
CALCIUM SERPL-MCNC: 9.2 MG/DL (ref 8.6–10.4)
CHLORIDE BLD-SCNC: 101 MMOL/L (ref 98–107)
CO2: 23 MMOL/L (ref 20–31)
CREAT SERPL-MCNC: 0.88 MG/DL (ref 0.7–1.2)
DIFFERENTIAL TYPE: YES
EOSINOPHILS RELATIVE PERCENT: 1 % (ref 0–5)
GFR AFRICAN AMERICAN: >60 ML/MIN
GFR NON-AFRICAN AMERICAN: >60 ML/MIN
GFR SERPL CREATININE-BSD FRML MDRD: ABNORMAL ML/MIN/{1.73_M2}
GFR SERPL CREATININE-BSD FRML MDRD: ABNORMAL ML/MIN/{1.73_M2}
GLUCOSE BLD-MCNC: 193 MG/DL (ref 70–99)
HCT VFR BLD CALC: 38.2 % (ref 41–53)
HEMOGLOBIN: 13 G/DL (ref 13.5–17.5)
IMMATURE GRANULOCYTES: ABNORMAL %
LYMPHOCYTES # BLD: 17 % (ref 13–44)
MCH RBC QN AUTO: 29.3 PG (ref 26–34)
MCHC RBC AUTO-ENTMCNC: 34.1 G/DL (ref 31–37)
MCV RBC AUTO: 85.9 FL (ref 80–100)
MONOCYTES # BLD: 2 % (ref 5–9)
NRBC AUTOMATED: ABNORMAL PER 100 WBC
PDW BLD-RTO: 13.6 % (ref 12.1–15.2)
PLATELET # BLD: 306 K/UL (ref 140–450)
PLATELET ESTIMATE: ABNORMAL
PMV BLD AUTO: ABNORMAL FL (ref 6–12)
POTASSIUM SERPL-SCNC: 4 MMOL/L (ref 3.7–5.3)
RBC # BLD: 4.45 M/UL (ref 4.5–5.9)
RBC # BLD: ABNORMAL 10*6/UL
SEG NEUTROPHILS: 80 % (ref 39–75)
SEGMENTED NEUTROPHILS ABSOLUTE COUNT: 8.9 K/UL (ref 2.1–6.5)
SODIUM BLD-SCNC: 137 MMOL/L (ref 135–144)
WBC # BLD: 11.1 K/UL (ref 3.5–11)
WBC # BLD: ABNORMAL 10*3/UL

## 2019-06-10 PROCEDURE — 36415 COLL VENOUS BLD VENIPUNCTURE: CPT

## 2019-06-10 PROCEDURE — 6370000000 HC RX 637 (ALT 250 FOR IP): Performed by: INTERNAL MEDICINE

## 2019-06-10 PROCEDURE — 94761 N-INVAS EAR/PLS OXIMETRY MLT: CPT

## 2019-06-10 PROCEDURE — 2580000003 HC RX 258: Performed by: INTERNAL MEDICINE

## 2019-06-10 PROCEDURE — 6360000002 HC RX W HCPCS: Performed by: INTERNAL MEDICINE

## 2019-06-10 PROCEDURE — 85025 COMPLETE CBC W/AUTO DIFF WBC: CPT

## 2019-06-10 PROCEDURE — 1200000000 HC SEMI PRIVATE

## 2019-06-10 PROCEDURE — 80048 BASIC METABOLIC PNL TOTAL CA: CPT

## 2019-06-10 PROCEDURE — 2500000003 HC RX 250 WO HCPCS: Performed by: INTERNAL MEDICINE

## 2019-06-10 RX ORDER — ZOLPIDEM TARTRATE 5 MG/1
10 TABLET ORAL NIGHTLY PRN
Status: DISCONTINUED | OUTPATIENT
Start: 2019-06-10 | End: 2019-06-11 | Stop reason: HOSPADM

## 2019-06-10 RX ADMIN — Medication 10 ML: at 09:57

## 2019-06-10 RX ADMIN — FLUOXETINE 40 MG: 10 CAPSULE ORAL at 21:10

## 2019-06-10 RX ADMIN — BACLOFEN 40 MG: 10 TABLET ORAL at 09:34

## 2019-06-10 RX ADMIN — OLANZAPINE 10 MG: 2.5 TABLET, FILM COATED ORAL at 21:10

## 2019-06-10 RX ADMIN — MORPHINE SULFATE 2 MG: 2 INJECTION, SOLUTION INTRAMUSCULAR; INTRAVENOUS at 21:10

## 2019-06-10 RX ADMIN — TRAZODONE HYDROCHLORIDE 300 MG: 50 TABLET ORAL at 21:09

## 2019-06-10 RX ADMIN — CLINDAMYCIN PHOSPHATE 900 MG: 900 INJECTION, SOLUTION INTRAVENOUS at 10:01

## 2019-06-10 RX ADMIN — Medication 10 ML: at 21:12

## 2019-06-10 RX ADMIN — BUSPIRONE HYDROCHLORIDE 30 MG: 5 TABLET ORAL at 21:09

## 2019-06-10 RX ADMIN — MORPHINE SULFATE 2 MG: 2 INJECTION, SOLUTION INTRAMUSCULAR; INTRAVENOUS at 16:53

## 2019-06-10 RX ADMIN — ZOLPIDEM TARTRATE 10 MG: 5 TABLET ORAL at 22:31

## 2019-06-10 RX ADMIN — MORPHINE SULFATE 2 MG: 2 INJECTION, SOLUTION INTRAMUSCULAR; INTRAVENOUS at 10:07

## 2019-06-10 RX ADMIN — CLINDAMYCIN PHOSPHATE 900 MG: 900 INJECTION, SOLUTION INTRAVENOUS at 22:31

## 2019-06-10 RX ADMIN — Medication 10 ML: at 21:11

## 2019-06-10 RX ADMIN — FLUOXETINE 40 MG: 10 CAPSULE ORAL at 09:34

## 2019-06-10 RX ADMIN — Medication 10 ML: at 16:46

## 2019-06-10 RX ADMIN — VANCOMYCIN HYDROCHLORIDE 1500 MG: 750 INJECTION, POWDER, LYOPHILIZED, FOR SOLUTION INTRAVENOUS at 11:08

## 2019-06-10 RX ADMIN — VANCOMYCIN HYDROCHLORIDE 1500 MG: 750 INJECTION, POWDER, LYOPHILIZED, FOR SOLUTION INTRAVENOUS at 23:38

## 2019-06-10 RX ADMIN — CLINDAMYCIN PHOSPHATE 900 MG: 900 INJECTION, SOLUTION INTRAVENOUS at 16:44

## 2019-06-10 RX ADMIN — BUSPIRONE HYDROCHLORIDE 30 MG: 5 TABLET ORAL at 09:33

## 2019-06-10 RX ADMIN — CLINDAMYCIN PHOSPHATE 900 MG: 900 INJECTION, SOLUTION INTRAVENOUS at 04:25

## 2019-06-10 RX ADMIN — MORPHINE SULFATE 4 MG: 4 INJECTION, SOLUTION INTRAMUSCULAR; INTRAVENOUS at 06:01

## 2019-06-10 ASSESSMENT — PAIN SCALES - GENERAL
PAINLEVEL_OUTOF10: 6
PAINLEVEL_OUTOF10: 7
PAINLEVEL_OUTOF10: 0
PAINLEVEL_OUTOF10: 5
PAINLEVEL_OUTOF10: 5
PAINLEVEL_OUTOF10: 0
PAINLEVEL_OUTOF10: 3
PAINLEVEL_OUTOF10: 3
PAINLEVEL_OUTOF10: 2
PAINLEVEL_OUTOF10: 5
PAINLEVEL_OUTOF10: 6

## 2019-06-10 ASSESSMENT — PAIN DESCRIPTION - ORIENTATION
ORIENTATION: RIGHT
ORIENTATION: RIGHT

## 2019-06-10 ASSESSMENT — PAIN DESCRIPTION - DESCRIPTORS: DESCRIPTORS: ACHING;BURNING

## 2019-06-10 ASSESSMENT — PAIN DESCRIPTION - PAIN TYPE: TYPE: ACUTE PAIN

## 2019-06-10 ASSESSMENT — PAIN DESCRIPTION - ONSET: ONSET: ON-GOING

## 2019-06-10 ASSESSMENT — PAIN DESCRIPTION - FREQUENCY: FREQUENCY: CONTINUOUS

## 2019-06-10 ASSESSMENT — PAIN DESCRIPTION - LOCATION
LOCATION: ARM
LOCATION: ARM

## 2019-06-10 NOTE — PROGRESS NOTES
Nutrition Assessment    Type and Reason for Visit: Initial    Nutrition Recommendations: Yogurt 2x daily    Nutrition Assessment: No nutrition diagnosis at this time. May benefit from use of probiotic r/t antibiotic use, and was encouraged to use two yogurts daily. Currently elevated glucose, ? R/t stress/infection. Malnutrition Assessment:  · Malnutrition Status: No malnutrition  · Context: Acute illness or injury  · Findings of the 6 clinical characteristics of malnutrition (Minimum of 2 out of 6 clinical characteristics is required to make the diagnosis of moderate or severe Protein Calorie Malnutrition based on AND/ASPEN Guidelines):  1. Energy Intake-Greater than 75% of estimated energy requirement,      2. Weight Loss-No significant weight loss,    3. Fat Loss-No significant subcutaneous fat loss,    4. Muscle Loss-No significant muscle mass loss,    5. Fluid Accumulation-Moderate to severe fluid accumulation, Extremities  6.   Strength-Not measured    Nutrition Risk Level: Low, Moderate    Nutrition Diagnosis:   · Problem: No nutrition diagnosis at this time    Objective Information:  · Nutrition-Focused Physical Findings: well nourished appearing  · Wound Type: Surgical Wound(I&D site)  · Current Nutrition Therapies:  · Oral Diet Orders: General   · Oral Diet intake: %(per interview)  · Oral Nutrition Supplement (ONS) Orders: None  · Anthropometric Measures:  · Ht: 5' 11\" (180.3 cm)   · Current Body Wt: 203 lb (92.1 kg)  · Admission Body Wt: 209 lb 6.4 oz (95 kg)  · Usual Body Wt: (denies changes)  · % Weight Change:  ,  denies changes  · Ideal Body Wt: 172 lb (78 kg), % Ideal Body 118%  · BMI Classification: BMI 25.0 - 29.9 Overweight    Lab Results   Component Value Date     06/10/2019    K 4.0 06/10/2019     06/10/2019    CO2 23 06/10/2019    BUN 19 06/10/2019    CREATININE 0.88 06/10/2019    GLUCOSE 193 (H) 06/10/2019    CALCIUM 9.2 06/10/2019    PROT 7.8 04/12/2019    LABALBU 4.3 04/12/2019    BILITOT 0.47 04/12/2019    ALKPHOS 61 04/12/2019    AST 76 (H) 04/12/2019     (H) 04/12/2019    LABGLOM >60 06/10/2019    GFRAA >60 06/10/2019    GLOB NOT REPORTED 10/02/2018     No results found for: LABA1C  No results found for: EAG    Nutrition Interventions:   Continue current diet  Continued Inpatient Monitoring, Coordination of Care, Education Initiated    Nutrition Evaluation:   · Evaluation: Goals set   · Goals: PO >75% meals with yogurt    · Monitoring: Meal Intake, Weight, Pertinent Labs, I&O, Patient/Family Education    Electronically signed by Harriet Robb RD, LD on 6/10/19 at [facility] Number: [id number]

## 2019-06-10 NOTE — PROGRESS NOTES
PICC line insertion completed by Access RN nurse. EKG will be faxed to confirm placement (fax number given). Zita  per Access RN nurse to use. Pt tolerated well. No complaints of pain- PICC site is CDI no redness or swelling and flushes easily. Paperwork placed on patient chart.

## 2019-06-10 NOTE — PROGRESS NOTES
Dressing change completed per parameters- Dressing with old sanguinous drainage removed. Incision swabbed with betadine, covered with 4x4's and kerlix, wrapped in ACE wrap. Pt tolerated procedure well. No complaints of pain afterward. Call light in reach. Will continue to monitor.
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
## 2019-06-10 NOTE — PROGRESS NOTES
Nurses in room for shift report. Pt in bed with eyes closed. He wakes easily, reports pain is\"good\" an states \"a 3/10\". No needs at this time. Electronically signed by Jessica Candelario RN on 6/10/2019 at 7:45 AM

## 2019-06-10 NOTE — PROGRESS NOTES
Discussed discharge plans with the patient. Patient is a 29year old male here with Right forearm abscess  cellulitis. He is alert and oriented. Patient is single and lives with his mother. He uses no medical equipment. Patient is independent with his ADL's. He manages his own medication. He does not drive. He walks to work and friend/family drives him to his appointments. His PCP is Dr. George Patel. He has medical insurance that helps with medication costs. The discharge plan is home with no services. He does not have advance directives and is not interested. ORION to monitor and assist with any needs or concerns as they arise.     ORION Pisano

## 2019-06-10 NOTE — PROGRESS NOTES
Quality flow rounds held on 6/10/19     Meka Hanna is admitted for  Right arm cellulits    Length of stay 1. Education:    Needed Education: wound care, meds, follow up      Do you have any questions regarding your plan of care while at the hospital? denies    Planned Disposition:               [x]  Home when able                [] Swing Bed                [] ECF/SNF               [] Other/TBD    Barriers to Discharge:    Can you afford your medications? Yes-has caresource   Do you have transportation to follow up appointments? Mom drives him, he does not drive   Do you need any new equipment at home? Wound care supplies-possibily   Current equipment includes   none    Do you have a living will or durable power of  for healthcare? no               If yes do we have a copy on file? N/A    Do you or your family have any questions or concerns we haven't already discussed? Laurita Jaffe Portal and writer present for rounding.

## 2019-06-10 NOTE — H&P
History & Physical    Patient:  Luis F Douglas  YOB: 1985  Date of Service: 6/10/2019  MRN: [id number]   Acct:   [de-identified]   Primary Care Physician: Lanie Lee MD    Chief Complaint:   Chief Complaint   Patient presents with    Arm Pain     Arm pain for three weeks. Believes he has an insect bite three weeks ago and now is red and swollen. History of Present Illness: The patient is a 29 y.o. male presented to ER c/o right forearm pain, swelling, redness. Symptoms started approximately 3 weeks ago with a small red bump. Per patient, it kept getting bigger, he tried to \"pop it\", but nothing came out. Eventually the right forearm became very swollen, pain increased, throbbing, 10 out of 10, constant. He became concerned that he might lose his arm and came to the emergency room for evaluation. He thinks that he probably had a spider bite causing the infection. He reports no associated fevers, chills, nausea, vomiting  The patient's work-up in the emergency room revealed stable vitals, no fever. BMP and CBC were unremarkable, he has no leukocytosis. CT scan of the right upper extremity revealed diffuse subcutaneous edematous changes with overlying skin thickening, 2.6 x 1.3 x 2.1 cm fluid collection consistent with an abscess. Orthopedic surgeon Dr. Emmanuel Kiser was contacted and evaluated the patient in the ER. Patient underwent bedside incision and drainage. Not much material was obtained during I&D the patient was recommended to be admitted for hospital for IV antibiotics. And close monitoring of the infection. Past Medical History:  History reviewed. No pertinent past medical history. Past Surgical History:  History reviewed. No pertinent surgical history. Home Medications:   No current facility-administered medications on file prior to encounter.       Current Outpatient Medications on File Prior to Encounter   Medication Sig Dispense Refill    traZODone (DESYREL) 150 MG tablet Take 300 mg by mouth nightly      OLANZapine (ZYPREXA) 10 MG tablet Take 10 mg by mouth nightly      baclofen (LIORESAL) 20 MG tablet Take 40 mg by mouth daily      FLUoxetine (PROZAC) 20 MG capsule Take 40 mg by mouth 2 times daily      L-Methylfolate-Algae (DEPLIN 15 PO) Take by mouth      zolpidem (AMBIEN CR) 12.5 MG extended release tablet Take 12.5 mg by mouth nightly.  busPIRone (BUSPAR) 30 MG tablet Take 30 mg by mouth 2 times daily         Allergies:  Patient has no known allergies. Social History:    reports that he has been smoking. He has never used smokeless tobacco.    Family History:   History reviewed. No pertinent family history. Review of systems:  Constitutional: no fever, chills, no no night sweats, no fatigue  Head: no headache, no dizziness  Eye: no blurring of vision, no double vision. Ears: no hearing difficulty, no tinnitus  Mouth/throat: no sore throat, no mouth lesions  Lungs: no cough, no shortness of breath, no wheeze  CVS: no palpitation, no chest pain, no shortness of breath  GI: no abdominal pain, no nausea , no vomiting, no constipation  PEG: no dysuria, frequency and urgency, no hematuria, no kidney stones  Musculoskeletal: Pain, redness and swelling of the right forearm   Endocrine: no polyuria, polydypsia, no cold or heat intolerence  Hematology: no anemia, no easy brusing or bleeding, no hx of clotting disorder  Dermatology: no skin rash,   Psychiatry: Positive for history of depression and anxiety  Neurology: no syncope, no seizures, no numbness or tingling of hands, no numbness or tingling of feet, no paresis    10 point review of systems completed, all other than noted above are negative. Vitals:   Vitals:    06/10/19 0421   BP: (!) 100/57   Pulse: 65   Resp: 18   Temp: 98 °F (36.7 °C)   SpO2: 95%      BMI: Body mass index is 28.31 kg/m².     Physical Exam:  General Appearance: alert and oriented to person, place and time, in no acute distress  Cardiovascular: normal rate, regular rhythm, normal S1 and S2, no murmurs, rubs, clicks, or gallops, distal pulses intact  Pulmonary/Chest: clear to auscultation bilaterally- no wheezes, rales or rhonchi, normal air movement, no respiratory distress  Abdomen: soft, non-tender, non-distended, normal bowel sounds, no masses   Extremities: no cyanosis, clubbing or edema  Skin: warm and dry, no rash or erythema, right forearm is wrapped  Head: normocephalic and atraumatic, mucosa moist  Eyes: pupils equal, round, and reactive to light  Neck: supple and non-tender without mass, no thyromegaly   Musculoskeletal: normal range of motion, no joint deformities or swelling  Neurological: alert, oriented, normal speech, no focal findings or movement disorder noted    Review of Labs and Diagnostic Testing:    Recent Results (from the past 24 hour(s))   CBC Auto Differential    Collection Time: 06/09/19  1:49 PM   Result Value Ref Range    WBC 10.9 3.5 - 11.0 k/uL    RBC 4.83 4.5 - 5.9 m/uL    Hemoglobin 14.3 13.5 - 17.5 g/dL    Hematocrit 41.1 41 - 53 %    MCV 85.1 80 - 100 fL    MCH 29.6 26 - 34 pg    MCHC 34.9 31 - 37 g/dL    RDW 13.7 12.1 - 15.2 %    Platelets 423 839 - 990 k/uL    MPV NOT REPORTED 6.0 - 12.0 fL    NRBC Automated NOT REPORTED per 100 WBC    Differential Type YES     Seg Neutrophils 67 39 - 75 %    Lymphocytes 23 13 - 44 %    Monocytes 8 5 - 9 %    Eosinophils % 2 0 - 5 %    Basophils 0 0 - 2 %    Immature Granulocytes NOT REPORTED 0 %    Segs Absolute 7.40 (H) 2.1 - 6.5 k/uL    Absolute Lymph # 2.50 1.0 - 4.8 k/uL    Absolute Mono # 0.90 0.0 - 1.0 k/uL    Absolute Eos # 0.20 0.0 - 0.4 k/uL    Basophils # 0.00 0.0 - 0.2 k/uL    Absolute Immature Granulocyte NOT REPORTED 0.00 - 0.30 k/uL    WBC Morphology NOT REPORTED     RBC Morphology NOT REPORTED     Platelet Estimate NOT REPORTED    Basic Metabolic Panel w/ Reflex to MG    Collection Time: 06/09/19  1:49 PM   Result Value Ref Range Glucose 109 (H) 70 - 99 mg/dL    BUN 16 6 - 20 mg/dL    CREATININE 1.11 0.70 - 1.20 mg/dL    Bun/Cre Ratio 14 9 - 20    Calcium 9.5 8.6 - 10.4 mg/dL    Sodium 137 135 - 144 mmol/L    Potassium 4.5 3.7 - 5.3 mmol/L    Chloride 99 98 - 107 mmol/L    CO2 25 20 - 31 mmol/L    Anion Gap 13 9 - 17 mmol/L    GFR Non-African American >60 >60 mL/min    GFR African American >60 >60 mL/min    GFR Comment          GFR Staging NOT REPORTED    CRP,High Sensitivity    Collection Time: 06/09/19  1:51 PM   Result Value Ref Range    CRP, High Sensitivity 118.7 (H) <3.1 mg/L   Sedimentation Rate    Collection Time: 06/09/19  1:51 PM   Result Value Ref Range    Sed Rate 27 (H) 0 - 20 mm       Radiology:     Ct Radius Ulna Right W Contrast    Result Date: 6/9/2019  CT RADIUS ULNA RIGHT W CONTRAST HISTORY: Arm pain for 3 weeks, status post insect bite. COMPARISON: None. TECHNIQUE: Axial contrast-enhanced CT images of the right radius/ulna were performed; coronal and sagittal reformats were provided. 75 mL of Isovue-370 was administered intravenously. FINDINGS: There is diffuse subcutaneous edematous change with overlying skin thickening consistent with cellulitis throughout the right radius/ulna region extending through the region of the elbow and wrist. Additionally there is a 2.6 x 1.3 x 2.1 cm rim-enhancing fluid collection within the soft tissues adjacent to the mid ulna, most consistent with abscess with significant surrounding inflammatory changes. Inflammatory changes abut and may involve the adjacent musculature. Significant relatively amorphous fluid involves the region of the soft tissues about the olecranon. No acute fracture, dislocation, cortical disruption, or periosteal reaction is evident. Diffuse subcutaneous edematous changes with overlying skin thickening throughout the right radius/ulna most consistent with cellulitis.  Additionally there is a 2.6 x 1.3 x 2.1 cm rim-enhancing fluid collection within the soft tissues adjacent to the mid ulna, most consistent with abscess with significant surrounding inflammatory changes. Assessment/ Plan:    1. Right forearm abscess  cellulitis, s/p I&D 6/9/19 by Dr. Lesly Barnes - on IV Vancomycin and Clidamycin, IV prn Morphine for pain, placed midline for IV access due to difficulties with peripheral IV placement  2. H/O Hep C  3. Depression,anxiety disorder - on Prozac, Zyprexa, Buspar, mood stable  4. History of IV drug use -per patient he quit 6 months ago after undergoing rehab. Medical Necessity: Inpatient admission is appropriate for this patient secondary to the need of  IV Anbx, IV fluids, pain management   Estimated length of stay: 2 days. The beneficiary may reasonably be expected to be discharged or transferred to a hospital within 96 hours after admission.     DVT prophylaxis:   [x] Lovenox   [] SCDs   [] SQ Heparin   [] Encourage ambulation, low risk for DVT, no chemical or mechanical    prophylaxis necessary      [] Already on Anticoagulation    Anticipated Disposition upon discharge:   [x] Home   [] Home with Home Health   [] [facility]   [] [address] [place] [address],[address]      Electronically signed by Jaida Ribera MD on 6/10/2019 at 6:04 AM

## 2019-06-10 NOTE — PROGRESS NOTES
Band ID and orders verified with access RN nurse and supervisor (supervisor placed order for PICC and verified that placement check can be verified per routine of access RN). Per Access RN nurse usually check with EKG.

## 2019-06-11 VITALS
OXYGEN SATURATION: 97 % | SYSTOLIC BLOOD PRESSURE: 112 MMHG | DIASTOLIC BLOOD PRESSURE: 66 MMHG | HEIGHT: 71 IN | WEIGHT: 203 LBS | TEMPERATURE: 97.8 F | BODY MASS INDEX: 28.42 KG/M2 | RESPIRATION RATE: 16 BRPM | HEART RATE: 77 BPM

## 2019-06-11 LAB
ABSOLUTE EOS #: 0.1 K/UL (ref 0–0.4)
ABSOLUTE IMMATURE GRANULOCYTE: ABNORMAL K/UL (ref 0–0.3)
ABSOLUTE LYMPH #: 2.6 K/UL (ref 1–4.8)
ABSOLUTE MONO #: 0.8 K/UL (ref 0–1)
ANION GAP SERPL CALCULATED.3IONS-SCNC: 12 MMOL/L (ref 9–17)
BASOPHILS # BLD: 0 % (ref 0–2)
BASOPHILS ABSOLUTE: 0 K/UL (ref 0–0.2)
BUN BLDV-MCNC: 20 MG/DL (ref 6–20)
BUN/CREAT BLD: 20 (ref 9–20)
CALCIUM SERPL-MCNC: 8.9 MG/DL (ref 8.6–10.4)
CHLORIDE BLD-SCNC: 105 MMOL/L (ref 98–107)
CO2: 24 MMOL/L (ref 20–31)
CREAT SERPL-MCNC: 0.98 MG/DL (ref 0.7–1.2)
CULTURE: NO GROWTH
DIFFERENTIAL TYPE: YES
DIRECT EXAM: ABNORMAL
DIRECT EXAM: ABNORMAL
EOSINOPHILS RELATIVE PERCENT: 1 % (ref 0–5)
GFR AFRICAN AMERICAN: >60 ML/MIN
GFR NON-AFRICAN AMERICAN: >60 ML/MIN
GFR SERPL CREATININE-BSD FRML MDRD: ABNORMAL ML/MIN/{1.73_M2}
GFR SERPL CREATININE-BSD FRML MDRD: ABNORMAL ML/MIN/{1.73_M2}
GLUCOSE BLD-MCNC: 106 MG/DL (ref 70–99)
HCT VFR BLD CALC: 36.1 % (ref 41–53)
HEMOGLOBIN: 12.4 G/DL (ref 13.5–17.5)
IMMATURE GRANULOCYTES: ABNORMAL %
LYMPHOCYTES # BLD: 27 % (ref 13–44)
Lab: ABNORMAL
MCH RBC QN AUTO: 29.6 PG (ref 26–34)
MCHC RBC AUTO-ENTMCNC: 34.2 G/DL (ref 31–37)
MCV RBC AUTO: 86.7 FL (ref 80–100)
MONOCYTES # BLD: 8 % (ref 5–9)
NRBC AUTOMATED: ABNORMAL PER 100 WBC
PDW BLD-RTO: 13.6 % (ref 12.1–15.2)
PLATELET # BLD: 301 K/UL (ref 140–450)
PLATELET ESTIMATE: ABNORMAL
PMV BLD AUTO: ABNORMAL FL (ref 6–12)
POTASSIUM SERPL-SCNC: 4 MMOL/L (ref 3.7–5.3)
RBC # BLD: 4.17 M/UL (ref 4.5–5.9)
RBC # BLD: ABNORMAL 10*6/UL
SEG NEUTROPHILS: 64 % (ref 39–75)
SEGMENTED NEUTROPHILS ABSOLUTE COUNT: 6.2 K/UL (ref 2.1–6.5)
SODIUM BLD-SCNC: 141 MMOL/L (ref 135–144)
SPECIMEN DESCRIPTION: ABNORMAL
WBC # BLD: 9.8 K/UL (ref 3.5–11)
WBC # BLD: ABNORMAL 10*3/UL

## 2019-06-11 PROCEDURE — 80048 BASIC METABOLIC PNL TOTAL CA: CPT

## 2019-06-11 PROCEDURE — 85025 COMPLETE CBC W/AUTO DIFF WBC: CPT

## 2019-06-11 PROCEDURE — 94761 N-INVAS EAR/PLS OXIMETRY MLT: CPT

## 2019-06-11 PROCEDURE — 2580000003 HC RX 258: Performed by: INTERNAL MEDICINE

## 2019-06-11 PROCEDURE — 2500000003 HC RX 250 WO HCPCS: Performed by: INTERNAL MEDICINE

## 2019-06-11 PROCEDURE — 36415 COLL VENOUS BLD VENIPUNCTURE: CPT

## 2019-06-11 PROCEDURE — 6370000000 HC RX 637 (ALT 250 FOR IP): Performed by: INTERNAL MEDICINE

## 2019-06-11 RX ORDER — LEVOFLOXACIN 500 MG/1
500 TABLET, FILM COATED ORAL DAILY
Qty: 5 TABLET | Refills: 0 | Status: SHIPPED | OUTPATIENT
Start: 2019-06-11 | End: 2019-06-16

## 2019-06-11 RX ORDER — DOXYCYCLINE HYCLATE 100 MG/1
100 CAPSULE ORAL 2 TIMES DAILY
Qty: 14 CAPSULE | Refills: 0 | Status: SHIPPED | OUTPATIENT
Start: 2019-06-11 | End: 2019-06-18

## 2019-06-11 RX ADMIN — BACLOFEN 40 MG: 10 TABLET ORAL at 08:22

## 2019-06-11 RX ADMIN — CLINDAMYCIN PHOSPHATE 900 MG: 900 INJECTION, SOLUTION INTRAVENOUS at 09:54

## 2019-06-11 RX ADMIN — FLUOXETINE 40 MG: 10 CAPSULE ORAL at 08:22

## 2019-06-11 RX ADMIN — Medication 10 ML: at 08:28

## 2019-06-11 RX ADMIN — BUSPIRONE HYDROCHLORIDE 30 MG: 5 TABLET ORAL at 08:21

## 2019-06-11 RX ADMIN — CLINDAMYCIN PHOSPHATE 900 MG: 900 INJECTION, SOLUTION INTRAVENOUS at 04:42

## 2019-06-11 ASSESSMENT — PAIN DESCRIPTION - LOCATION: LOCATION: ARM

## 2019-06-11 ASSESSMENT — PAIN DESCRIPTION - ORIENTATION: ORIENTATION: RIGHT

## 2019-06-11 ASSESSMENT — PAIN SCALES - GENERAL
PAINLEVEL_OUTOF10: 0
PAINLEVEL_OUTOF10: 4
PAINLEVEL_OUTOF10: 2

## 2019-06-11 ASSESSMENT — PAIN DESCRIPTION - ONSET: ONSET: ON-GOING

## 2019-06-11 ASSESSMENT — PAIN DESCRIPTION - DESCRIPTORS: DESCRIPTORS: ACHING;BURNING

## 2019-06-11 ASSESSMENT — PAIN DESCRIPTION - PAIN TYPE: TYPE: ACUTE PAIN

## 2019-06-11 ASSESSMENT — PAIN DESCRIPTION - FREQUENCY: FREQUENCY: CONTINUOUS

## 2019-06-11 NOTE — PROGRESS NOTES
Call back rec'd from Dr. Anisha Ramírez. OK to discharge from his standpoint. Call made and msg left to Dr. Alfreda Kennedy. Electronically signed by Jessica Candelario RN on 6/11/2019 at 9:49 AM

## 2019-06-11 NOTE — PROGRESS NOTES
Call to Carolann Richey to contact Dr. Jeff Mondragon. Msg givene and awaiting call back. Electronically signed by Zahra Huff RN on 6/11/2019 at 9:31 AM

## 2019-06-11 NOTE — DISCHARGE SUMMARY
Hospitalist Discharge Summary    Patient:  Kulwant Plaza  YOB: 1985    MRN: [id number]   Acct: [de-identified]    Primary Care Physician: Muriel Carmona MD    Admit date:  6/9/2019    Discharge date:  6/11/2019       Discharge Diagnoses:     1. Forearm abscess/cellulitis, status post I&D on 6/9/2019 by Dr. Gabriel Officer  2. Depression and anxiety disorder  3. History of hep C  4. History of IV drug use      Discharge Medications:       [facility]   Home Medication Instructions [facility]:[id number]    Printed on:06/11/19 1038   Medication Information                      baclofen (LIORESAL) 20 MG tablet  Take 40 mg by mouth daily             busPIRone (BUSPAR) 30 MG tablet  Take 30 mg by mouth 2 times daily             doxycycline hyclate (VIBRAMYCIN) 100 MG capsule  Take 1 capsule by mouth 2 times daily for 7 days             FLUoxetine (PROZAC) 20 MG capsule  Take 40 mg by mouth 2 times daily             L-Methylfolate-Algae (DEPLIN 15 PO)  Take by mouth             levofloxacin (LEVAQUIN) 500 MG tablet  Take 1 tablet by mouth daily for 5 days             OLANZapine (ZYPREXA) 10 MG tablet  Take 10 mg by mouth nightly             traZODone (DESYREL) 150 MG tablet  Take 300 mg by mouth nightly             zolpidem (AMBIEN CR) 12.5 MG extended release tablet  Take 12.5 mg by mouth nightly. Diet:  DIET GENERAL; Activity: As tolerates    Follow-up:  in 1 weeks with Muriel Carmona MD, follow-up with Dr. Gabriel Officer, orthopedic surgeon, in 1 week.   Patient to make an appointment      Consultants:  Dr. Gabriel Officer for I&D of the right forearm abscess          Physical Exam:    Vitals:  Patient Vitals for the past 24 hrs:   BP Temp Temp src Pulse Resp SpO2 Height   06/11/19 0958 -- -- -- -- 16 97 % --   06/11/19 0945 -- 97.8 °F (36.6 °C) Oral -- -- -- --   06/11/19 0815 112/66 98.4 °F (36.9 °C) Oral 77 18 97 % --   06/11/19 0430 108/69 97.6 °F (36.4 °C) Oral 74 20 96 % -- incision and drainage. Not much material was obtained during I&D . The patient was admitted for IV antibiotics. Was treated with IV clindamycin and vancomycin. The pain and swelling in the right forearm is significantly better today. Patient would like to go home. He has no fever, no leukocytosis. Wound culture preliminary shows no bacteria. Charge the patient home with oral doxycycline and Levaquin. He is to continue wound care as recommended by Dr. Paolo Koch. Follow-up with PCP in 1 week.   He is also asked to make an appointment and follow-up with Dr. Paolo Koch in 1 week      Disposition: home    Condition: Stable    Time Spent: 32 minutes      Electronically signed by German Chavis MD on 6/11/2019 at 10:38 AM  Discharging Hospitalist

## 2019-06-11 NOTE — PROGRESS NOTES
Call to Dr. Paolo Terrell office. Office hours begin at 0830. Will call at that time.  Electronically signed by Shayna Stafford RN on 6/11/2019 at 8:12 AM

## 2019-06-11 NOTE — PLAN OF CARE
Problem: Pain:  Goal: Pain level will decrease  Description  Pain level will decrease  Outcome: Met This Shift  Goal: Control of acute pain  Description  Control of acute pain  Outcome: Met This Shift     Problem: Infection:  Goal: Will remain free from infection  Description  Will remain free from infection  Outcome: Met This Shift     Problem: Safety:  Goal: Free from accidental physical injury  Description  Free from accidental physical injury  Outcome: Met This Shift  Goal: Free from intentional harm  Description  Free from intentional harm  Outcome: Met This Shift     Problem: Daily Care:  Goal: Daily care needs are met  Description  Daily care needs are met  Outcome: Met This Shift     Problem: Physical Regulation:  Goal: Will remain free from infection  Description  Will remain free from infection  Outcome: Met This Shift     Problem: Skin Integrity:  Goal: Demonstration of wound healing without infection will improve  Description  Demonstration of wound healing without infection will improve  Outcome: Ongoing

## 2019-06-11 NOTE — PROGRESS NOTES
PICC line removed, pt tolerated well. Catheter tip intact, length sera 40cm. Pressure held x 5 minutes. Dry dressing applied. Electronically signed by Suzzane Phalen, RN on 6/11/2019 at 11:20 AM

## 2019-06-11 NOTE — PROGRESS NOTES
Pt discharged to home today via private vehicle with family. No further discharge needs expressed.  Jose Han 6/11/2019

## 2019-06-11 NOTE — PROGRESS NOTES
Patient remains afebrile. Wound cleansed with betadine per order, dressing changed. Scant drainage. Hard area noted jimena wound area inferior to wound. Overall, rt forearm swelling has decreased but remains swollen on underside of arm jimena wound. Electronically signed by Missy Chu RN on 6/11/2019 at 10:30 AM

## 2019-06-11 NOTE — PROGRESS NOTES
Call to Dr. Nunez office. Message left. Electronically signed by Katty Adames RN on 6/11/2019 at 8:57 AM

## 2019-06-11 NOTE — PROGRESS NOTES
Pt educated on ATB and wound care. T.O. From Dr. Vibha Worley to continue the wound care protocol post discharge and to f/U with him at the [facility] in one week. Pt aware and verbalizes understanding. All belongings packed and with patient. Electronically signed by Colletta Jakes, RN on 6/11/2019 at 10:59 AM

## 2019-06-15 LAB
CULTURE: NORMAL
Lab: NORMAL
SPECIMEN DESCRIPTION: NORMAL

## 2019-06-25 ENCOUNTER — HOSPITAL ENCOUNTER (OUTPATIENT)
Age: 34
Setting detail: SPECIMEN
Discharge: HOME | End: 2019-06-25
Payer: COMMERCIAL

## 2019-06-25 PROCEDURE — 87493 C DIFF AMPLIFIED PROBE: CPT

## 2019-06-26 LAB
C DIFFICILE TOXINS, PCR: NORMAL
CULTURE: NORMAL
DIRECT EXAM: NORMAL
Lab: NORMAL
SPECIMEN DESCRIPTION: NORMAL
SPECIMEN DESCRIPTION: NORMAL

## 2019-11-04 ENCOUNTER — HOSPITAL ENCOUNTER (OUTPATIENT)
Age: 34
Discharge: HOME | End: 2019-11-04
Payer: COMMERCIAL

## 2019-11-04 LAB
ABSOLUTE EOS #: 0.1 K/UL (ref 0–0.4)
ABSOLUTE IMMATURE GRANULOCYTE: ABNORMAL K/UL (ref 0–0.3)
ABSOLUTE LYMPH #: 2.7 K/UL (ref 1–4.8)
ABSOLUTE MONO #: 0.7 K/UL (ref 0–1)
ALBUMIN SERPL-MCNC: 4.9 G/DL (ref 3.5–5.2)
ALBUMIN/GLOBULIN RATIO: ABNORMAL (ref 1–2.5)
ALP BLD-CCNC: 61 U/L (ref 40–129)
ALT SERPL-CCNC: 48 U/L (ref 5–41)
ANION GAP SERPL CALCULATED.3IONS-SCNC: 15 MMOL/L (ref 9–17)
AST SERPL-CCNC: 29 U/L
BASOPHILS # BLD: 0 % (ref 0–2)
BASOPHILS ABSOLUTE: 0 K/UL (ref 0–0.2)
BILIRUB SERPL-MCNC: 0.51 MG/DL (ref 0.3–1.2)
BILIRUBIN URINE: NEGATIVE
BUN BLDV-MCNC: 18 MG/DL (ref 6–20)
BUN/CREAT BLD: ABNORMAL (ref 9–20)
CALCIUM SERPL-MCNC: 10.3 MG/DL (ref 8.6–10.4)
CHLORIDE BLD-SCNC: 101 MMOL/L (ref 98–107)
CO2: 20 MMOL/L (ref 20–31)
COLOR: YELLOW
COMMENT UA: NORMAL
CREAT SERPL-MCNC: 1.22 MG/DL (ref 0.7–1.2)
DIFFERENTIAL TYPE: YES
EOSINOPHILS RELATIVE PERCENT: 1 % (ref 0–5)
GFR AFRICAN AMERICAN: >60 ML/MIN
GFR NON-AFRICAN AMERICAN: >60 ML/MIN
GFR SERPL CREATININE-BSD FRML MDRD: ABNORMAL ML/MIN/{1.73_M2}
GFR SERPL CREATININE-BSD FRML MDRD: ABNORMAL ML/MIN/{1.73_M2}
GLUCOSE BLD-MCNC: 92 MG/DL (ref 70–99)
GLUCOSE URINE: NEGATIVE
HCT VFR BLD CALC: 35.8 % (ref 41–53)
HEMOGLOBIN: 12.6 G/DL (ref 13.5–17.5)
IMMATURE GRANULOCYTES: ABNORMAL %
KETONES, URINE: NEGATIVE
LEUKOCYTE ESTERASE, URINE: NEGATIVE
LYMPHOCYTES # BLD: 38 % (ref 13–44)
MCH RBC QN AUTO: 30.9 PG (ref 26–34)
MCHC RBC AUTO-ENTMCNC: 35.1 G/DL (ref 31–37)
MCV RBC AUTO: 88 FL (ref 80–100)
MONOCYTES # BLD: 9 % (ref 5–9)
NITRITE, URINE: NEGATIVE
NRBC AUTOMATED: ABNORMAL PER 100 WBC
PDW BLD-RTO: 14.3 % (ref 12.1–15.2)
PH UA: 7 (ref 5–8)
PLATELET # BLD: 226 K/UL (ref 140–450)
PLATELET ESTIMATE: ABNORMAL
PMV BLD AUTO: ABNORMAL FL (ref 6–12)
POTASSIUM SERPL-SCNC: 4.7 MMOL/L (ref 3.7–5.3)
PROTEIN UA: NEGATIVE
RBC # BLD: 4.06 M/UL (ref 4.5–5.9)
RBC # BLD: ABNORMAL 10*6/UL
SEG NEUTROPHILS: 52 % (ref 39–75)
SEGMENTED NEUTROPHILS ABSOLUTE COUNT: 3.6 K/UL (ref 2.1–6.5)
SODIUM BLD-SCNC: 136 MMOL/L (ref 135–144)
SPECIFIC GRAVITY UA: 1 (ref 1–1.03)
TOTAL PROTEIN: 8.7 G/DL (ref 6.4–8.3)
TSH SERPL DL<=0.05 MIU/L-ACNC: 1.16 MIU/L (ref 0.3–5)
TURBIDITY: CLEAR
URINE HGB: NEGATIVE
UROBILINOGEN, URINE: NORMAL
WBC # BLD: 7 K/UL (ref 3.5–11)
WBC # BLD: ABNORMAL 10*3/UL

## 2019-11-04 PROCEDURE — 80053 COMPREHEN METABOLIC PANEL: CPT

## 2019-11-04 PROCEDURE — 81003 URINALYSIS AUTO W/O SCOPE: CPT

## 2019-11-04 PROCEDURE — 84443 ASSAY THYROID STIM HORMONE: CPT

## 2019-11-04 PROCEDURE — 85025 COMPLETE CBC W/AUTO DIFF WBC: CPT

## 2019-11-04 PROCEDURE — 36415 COLL VENOUS BLD VENIPUNCTURE: CPT
